# Patient Record
Sex: MALE | Race: WHITE | NOT HISPANIC OR LATINO | URBAN - METROPOLITAN AREA
[De-identification: names, ages, dates, MRNs, and addresses within clinical notes are randomized per-mention and may not be internally consistent; named-entity substitution may affect disease eponyms.]

---

## 2018-09-12 ENCOUNTER — EMERGENCY (EMERGENCY)
Facility: HOSPITAL | Age: 33
LOS: 1 days | Discharge: HOME | End: 2018-09-12
Attending: EMERGENCY MEDICINE
Payer: COMMERCIAL

## 2018-09-12 VITALS
RESPIRATION RATE: 18 BRPM | SYSTOLIC BLOOD PRESSURE: 136 MMHG | HEART RATE: 61 BPM | OXYGEN SATURATION: 97 % | DIASTOLIC BLOOD PRESSURE: 81 MMHG

## 2018-09-12 VITALS — HEIGHT: 71 IN | WEIGHT: 229.94 LBS

## 2018-09-12 LAB
BLOOD GAS SOURCE: SIGNIFICANT CHANGE UP
COHGB MFR BLDV: 6.5 % — HIGH (ref 0–2)
HGB BLD CALC-MCNC: 15 G/DL — SIGNIFICANT CHANGE UP (ref 13–17)

## 2018-09-12 PROCEDURE — 82375 ASSAY CARBOXYHB QUANT: CPT

## 2018-09-12 PROCEDURE — 93005 ELECTROCARDIOGRAM TRACING: CPT

## 2018-09-12 PROCEDURE — 99284 EMERGENCY DEPT VISIT MOD MDM: CPT

## 2018-09-12 PROCEDURE — 93010 ELECTROCARDIOGRAM REPORT: CPT

## 2018-09-12 PROCEDURE — 99285 EMERGENCY DEPT VISIT HI MDM: CPT

## 2018-09-12 NOTE — ED PROVIDER NOTE - OBJECTIVE STATEMENT
34 y/o M pt presents to ED c/o HA, dizziness and nausea that began gradually today. 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 He is unsure if they had proper ventilation and was concerned about possible CO exposure and went home from work early. Pt took Excedrin with some relief. Denies CP and SOB. No further complaints at this time.

## 2018-09-12 NOTE — ED PROVIDER NOTE - MEDICAL DECISION MAKING DETAILS
PT with stable VS, no acute distress, non toxic appearing, tolerating PO in the ED, resolution of symptoms after conservative medical intervention with 100% O2 for 1 HR. PT will be dc home with follow up to PCP, educated about when to return to the ED if needed. PT verbalizes that he understands all instructions and results. Pt educated about the risks vs benefits of imaging at this time and agrees that not warranted for their symptoms, and PE

## 2018-09-12 NOTE — ED PROVIDER NOTE - ATTENDING CONTRIBUTION TO CARE
The patient seen and examined.  The patient had a carbon monoxide exposure and had a headache and nausea  CO level 6.5  Oxygen given    CO Exposure      I, Akash Faulkner, performed the initial face to face bedside interview with this patient regarding history of present illness, review of symptoms and relevant past medical, social and family history.  I completed an independent physical examination.  I was the initial provider who evaluated this patient. I have signed out the follow up of any pending tests (i.e. labs, radiological studies) to the ACP.  I have communicated the patient’s plan of care and disposition with the ACP.  The history, relevant review of systems, past medical and surgical history, medical decision making, and physical examination was documented by the scribe in my presence and I attest to the accuracy of the documentation.

## 2018-09-12 NOTE — ED ADULT NURSE NOTE - OBJECTIVE STATEMENT
Assumed care at 2010.  A+OX4, patient reports "at work today in a room with 4 generators running, I think I was exposed to carbon dioxide".  c/o h/a, light headed, dizzy, nausea starting mid-day today

## 2018-09-12 NOTE — ED ADULT TRIAGE NOTE - CHIEF COMPLAINT QUOTE
at work and generators were running. light headed, dizzy. unknown if co2 exposure, no dyspnea, no chest pain.

## 2024-03-23 NOTE — ED ADULT NURSE NOTE - CAS EDN INTEG ASSESS
Med rec updated and complete. Allergies reviewed.  Confirmed name and date of birth.  Interviewed pt/family at bedside.  No anticoagulant or antiplatelet medications.  No outpatient antibiotic use in last 30 days.      Home pharmacy  Long term= [facility]= [phone]  Short term = [facility] = [phone]      [facility]